# Patient Record
Sex: MALE | HISPANIC OR LATINO | URBAN - METROPOLITAN AREA
[De-identification: names, ages, dates, MRNs, and addresses within clinical notes are randomized per-mention and may not be internally consistent; named-entity substitution may affect disease eponyms.]

---

## 2017-03-06 PROCEDURE — 71010: CPT | Mod: 26

## 2017-03-06 PROCEDURE — 99285 EMERGENCY DEPT VISIT HI MDM: CPT

## 2017-03-06 PROCEDURE — 74177 CT ABD & PELVIS W/CONTRAST: CPT | Mod: 26

## 2017-03-07 ENCOUNTER — INPATIENT (INPATIENT)
Facility: HOSPITAL | Age: 22
LOS: 0 days | Discharge: HOME | End: 2017-03-08
Payer: MEDICAID

## 2023-04-05 PROBLEM — Z00.00 ENCOUNTER FOR PREVENTIVE HEALTH EXAMINATION: Status: ACTIVE | Noted: 2023-04-05

## 2023-04-06 ENCOUNTER — APPOINTMENT (OUTPATIENT)
Dept: CARDIOLOGY | Facility: CLINIC | Age: 28
End: 2023-04-06
Payer: COMMERCIAL

## 2023-04-06 VITALS — DIASTOLIC BLOOD PRESSURE: 62 MMHG | SYSTOLIC BLOOD PRESSURE: 122 MMHG

## 2023-04-06 VITALS
RESPIRATION RATE: 14 BRPM | WEIGHT: 125 LBS | OXYGEN SATURATION: 99 % | TEMPERATURE: 97.1 F | HEART RATE: 60 BPM | DIASTOLIC BLOOD PRESSURE: 60 MMHG | SYSTOLIC BLOOD PRESSURE: 126 MMHG | BODY MASS INDEX: 20.83 KG/M2 | HEIGHT: 65 IN

## 2023-04-06 DIAGNOSIS — Z78.9 OTHER SPECIFIED HEALTH STATUS: ICD-10-CM

## 2023-04-06 DIAGNOSIS — R07.9 CHEST PAIN, UNSPECIFIED: ICD-10-CM

## 2023-04-06 PROCEDURE — 99203 OFFICE O/P NEW LOW 30 MIN: CPT

## 2023-04-06 NOTE — DISCUSSION/SUMMARY
[FreeTextEntry1] : 1. Chest Pain: not cardiac in nature. Appears more GI related given it's relation with fast food. Recommend better diet for patient. If continues follow up with PCP/GI for further evaluation. No further cardiac testing indicated at this time\par \par Follow up on a PRN basis.

## 2023-04-06 NOTE — HISTORY OF PRESENT ILLNESS
[FreeTextEntry1] : 27 year old male with no PMhx presents for a cardiac evaluation. Patients states he has 1 year of chest pain described as a burning in the center of his chest mainly after eating fast foods. He has no exertional chest pain or pressure. No abnormal dyspnea.\par \par There is no history of MI, CVA, CHF, or previous coronary intervention.\par